# Patient Record
Sex: FEMALE
[De-identification: names, ages, dates, MRNs, and addresses within clinical notes are randomized per-mention and may not be internally consistent; named-entity substitution may affect disease eponyms.]

---

## 2019-09-11 ENCOUNTER — HOSPITAL ENCOUNTER (OUTPATIENT)
Dept: HOSPITAL 46 - ED | Age: 39
Setting detail: OBSERVATION
LOS: 1 days | Discharge: HOME | End: 2019-09-12
Attending: OBSTETRICS & GYNECOLOGY | Admitting: OBSTETRICS & GYNECOLOGY
Payer: SELF-PAY

## 2019-09-11 VITALS — HEIGHT: 63 IN | WEIGHT: 185.36 LBS | BODY MASS INDEX: 32.84 KG/M2

## 2019-09-11 DIAGNOSIS — E66.9: ICD-10-CM

## 2019-09-11 DIAGNOSIS — Z79.4: ICD-10-CM

## 2019-09-11 DIAGNOSIS — Z88.5: ICD-10-CM

## 2019-09-11 DIAGNOSIS — E11.9: ICD-10-CM

## 2019-09-11 DIAGNOSIS — O03.4: Primary | ICD-10-CM

## 2019-09-11 DIAGNOSIS — Z88.0: ICD-10-CM

## 2019-09-11 PROCEDURE — G0378 HOSPITAL OBSERVATION PER HR: HCPCS

## 2019-09-11 PROCEDURE — 10D17Z9 MANUAL EXTRACTION OF PRODUCTS OF CONCEPTION, RETAINED, VIA NATURAL OR ARTIFICIAL OPENING: ICD-10-PCS | Performed by: OBSTETRICS & GYNECOLOGY

## 2019-09-11 NOTE — HP
[facility]
                                    [address], [place]  [address]
_________________________________________________________________________________________
                                                                 Draft    
 
 
ADMISSION DATE:  
2019
 
HISTORY OF PRESENT ILLNESS:  
The patient is a 39-year-old  2, para 1,  female, who apparently went
through a spontaneous  back on 2019 in [place], and carries with her a
picture on her phone showing a small fetus what appears to be placental tissue and cord.
 Nonetheless, she was given some misoprostol to probably try to complete evacuation of
anything that was left in the uterus and she continued to have some mild spotting,
bleeding, and cramping since then, but about 4 days ago, she started having much more
severe pain and a little bit heavier bleeding.  She presented to the emergency room this
evening in [place] with severe pelvic pain and also one day history of chest and back
pain that appears to be anxiety-related. 
 
PAST MEDICAL HISTORY:  
She had a prior pregnancy 3-1/2 years ago, vaginal delivery without difficulties.  She
was diabetic during that pregnancy and required insulin.  She has been diagnosed with
diabetes, not just gestational diabetes and has been on metformin 500 mg twice daily and
also takes NovoLog 40 units at bedtime and then p.r.n. NovoLog 3 times a day before
meals based upon blood sugar fingersticks.  This morning, when she had breakfast about
10 o'clock, her blood sugar prior to that was 154, and she took 7 units of NovoLog.
(Her admission blood sugar in the emergency room is 228).  Past medical history is
unremarkable, but she did have  section with her first pregnancy, which is the
only surgery that she has had. 
 
ALLERGIES:  
She is allergic to penicillin.
 
FAMILY HISTORY:  
Mother with diabetes and hypertension, and also had a very early breast cancer few years
ago, which apparently has been cured. 
 
PHYSICAL EXAMINATION:
VITAL SIGNS:  All normal.  Those specific values are documented on the chart.  On
admission, she did have a tachycardia and along with the chest pain, she did a full EKG,
which was read as completely normal.  The tachycardia, however, has essentially
resolved, without IV fluids or any other treatment. 
HEART:  Regular rate and rhythm without murmurs. 
LUNGS:  Clear to auscultation. 
BACK:  No CVA tenderness, although she does express pain in both the chest and back
which is very generalized and pretty much mid position, there is no tenderness anywhere. 
PELVIC:  On pelvic examination, which is just done on the exam bed without a speculum
 
                                                                                    
_________________________________________________________________________________________
PATIENT NAME:     DOC SANDERSON                  
MEDICAL RECORD #: [id number]            HISTORY AND PHYSICAL          
          ACCT #: [id number]  
DATE OF BIRTH:   [birth year]            REPORT #: [id number]      
PHYSICIAN:                                      
PCP:              NO PRIMARY CARE PHYSICIAN     
REPORT IS CONFIDENTIAL AND NOT TO BE RELEASED WITHOUT AUTHORIZATION
 
 
                                  [facility]
                                    [address], [place]  [address]
_________________________________________________________________________________________
                                                                 Draft    
 
 
exam, the uterus seems to be probably anteflexed, but is difficult to palpate due to
abdominal obesity and tenderness.  The uterus is somewhat tender.  Cervix is palpated,
feels normal, but feels closed.  There is small amount bloody mucus on the examining
glove upon exit from the vagina. 
EXTREMITIES:  Within normal limits.
 
RESULTS:  
CBC referred to chart, but hemoglobin was normal and white count was 11,000.  The blood
sugar, as mentioned above was 228.  Other labs were normal.  Pelvic ultrasound was done,
which shows products of conception retained with considerable "debris" within the uterus
which appears to be slightly enlarged by measurement. 
 
IMPRESSION:  
Retained product of conception following spontaneous  about a month and half
ago.  There is no evidence that the patient is infected at this point, but with the
increasing pelvic pain, this could be in the consideration for an impending infection. 
 
PLAN:  
We will perform a suction D and C to remove any retaining products of conception, and
then evaluate as to whether we can send her home this evening after the surgery or need
to keep her overnight depending on how much pain she still has and depending on her
vital signs as well.  I will give her a dose of antibiotic, probably 2 g of Ancef prior
to the D and C. 
 
 
 
            ________________________________________
            MD STEVE Wiggins/CHARLES
Job #:  [id number]
DD:  2019 19:40:34
DT:  2019 21:48:36
 
 
Copies:                                
~
 
 
 
 
 
                                                                                    
_________________________________________________________________________________________
PATIENT NAME:     DOC SANDERSON                  
MEDICAL RECORD #: [id number]            HISTORY AND PHYSICAL          
          ACCT #: [id number]  
DATE OF BIRTH:   [birth year]            REPORT #: [id number]      
PHYSICIAN:                                      
PCP:              NO PRIMARY CARE PHYSICIAN     
REPORT IS CONFIDENTIAL AND NOT TO BE RELEASED WITHOUT AUTHORIZATION

## 2019-09-11 NOTE — OR
[facility]
                                    [address] [facility]
                                  [place], [place]  [address]
_________________________________________________________________________________________
                                                                 Draft    
 
 
DATE OF OPERATION:
2019
 
SURGEON:
Lukas Vick MD
 
PREOPERATIVE DIAGNOSIS:
Spontaneous  with retained products of conception.
 
POSTOPERATIVE DIAGNOSIS:
Spontaneous  with retained products of conception.
 
PROCEDURE PERFORMED:
Suction and sharp curettage.
 
ANESTHESIA:
General; Cleveland Sifuentes CRNA.
 
ESTIMATED BLOOD LOSS:
435 mL (measured).
 
COMPLICATIONS:
None.
 
DESCRIPTION OF PROCEDURE:
The patient was taken to the operating room, placed in the dorsal lithotomy position
under general anesthesia and prepped and draped in the usual fashion.  Her exam under
anesthesia revealed the uterus to be anteflexed and approximately normal size, but this
was difficult to ascertain secondary to abdominal obesity.  A Graves speculum was placed
for easy visualization of the cervix, and the cervix was grasped with the single-tooth
tenaculum on the anterior lip.  The uterine sound was inserted and the uterus sounded to
12 cm depth.  Hegar dilators were utilized up to a #10 dilator and then a #9 curved
suction curette was introduced.  A moderate amount of blood and clot and perhaps a small
amount of apparent products of concept was obtained, but not a significant amount of
products of conception.  The suction curette was removed and a sharp curette and then
serrated curette were utilized to attempt to ascertain complete removal of all products
of conception on all the endometrial surface areas.  Following this, the suction curette
was reintroduced and some additional blood was obtained with very little additional
tissue if any.  A 1000 mcg Cytotec suppository was placed rectally and the uterus was
massaged and the bleeding stopped rather abruptly.  The patient tolerated the procedure
well. 
 
 
                                                                                    
_________________________________________________________________________________________
PATIENT NAME:     DOC SANDERSON                  
MEDICAL RECORD #: [id number]            OPERATIVE REPORT              
          ACCT #: [id number]  
DATE OF BIRTH:   [birth year]            REPORT #: [id number]      
PHYSICIAN:                                      
PCP:              NO PRIMARY CARE PHYSICIAN     
REPORT IS CONFIDENTIAL AND NOT TO BE RELEASED WITHOUT AUTHORIZATION
 
 
                                  [address]
                                  [place]  [address]
_________________________________________________________________________________________
                                                                 Draft    
 
 
 
            ________________________________________
            MD JOHNNA Wiggins
Job #:  [id number]
DD:  2019 20:49:11
DT:  2019 22:07:39
 
 
Copies:                                
~
 
 
 
 
 
 
 
 
 
 
 
 
 
 
 
 
 
 
 
 
 
 
 
 
 
 
 
 
 
 
 
                                                                                    
_________________________________________________________________________________________
PATIENT NAME:     DOC SANDERSON                  
MEDICAL RECORD #: [id number]            OPERATIVE REPORT              
          ACCT #: [id number]  
DATE OF BIRTH:   [birth year]            REPORT #: [id number]      
PHYSICIAN:                                      
PCP:              NO PRIMARY CARE PHYSICIAN     
REPORT IS CONFIDENTIAL AND NOT TO BE RELEASED WITHOUT AUTHORIZATION

## 2019-09-11 NOTE — DS
[facility]
                                    [address], [place]  [address]
_________________________________________________________________________________________
                                                                 Draft    
 
 
ADMISSION DATE:  2019
 
DISCHARGE DATE:  2019
 
HOSPITAL COURSE:
The patient is a 39-year-old  3, para 2, AB 1, who was admitted through the
emergency room last night with severe pelvic pain x3-4 days and retained products of
conception.  She had undergone a spontaneous  on 2019 in [place] and was
given single dose of misoprostol to hopefully complete a removal of products of
conception.  This apparently was not sufficient to completely empty the uterus and she
continued to have some bleeding off and on for the last month and half until she began
starting having severe pain a few days ago.  She denied any fever or any other symptoms
whatever.  On admission to the emergency room, she was afebrile and her vital signs were
essentially normal, although she did have an admission tachycardia that resolved with
simply some time in the emergency room without any therapy whatever.  She was also
having some chest pain and back pain at the same time and an EKG was done, which was
read as normal.  The chest pain was assumed to simply be anxiety.  Her admission lab
work was normal with a hemoglobin of 14.6 and a normal lipase of 16, but she did have
sightly elevated liver enzymes, unexplained by anything in her history.  Upon my
examination, she had a somewhat tender uterus on bimanual exam and the cervix appeared
to be completely closed with a small amount of bloody mucus being present.  The rest of
her examination was quite unremarkable.  The ultrasound indicated quite obvious retained
products of conception and she was taken to the operating room yesterday evening where a
suction and sharp D and C was accomplished with removal of any remaining products of
concept, with 435 mL of measured blood loss during the surgery.  Postoperatively, in
terms of the D and C, she was fine with very minimal bleeding, but she did resume with
her chest pain and abdominal pain, which was very vague and difficult to localize.
Because of concern of possible gallbladder disease, an upper abdomen ultrasound was
accomplished, which was completely negative for all organ systems visualized.  She has
been diabetic for many years and Medicine consult was obtained with management of her
diabetes through the night.  Her hemoglobin Alc was 9.1 on admission, so she obviously
is not well controlled, but she will resume her normal insulin administration, which
consists of 40 units of Lantus at bedtime and sliding scale NovoLog injection before
meals.  She is to follow up with her regular doctor in [place] for continued diabetic
care.  She is also to follow up with her OB care provider in [place] as far as
completion of this SAB.  Upon discharge, she was pain free, having minimal bleeding and
very hungry and ready to be discharged.  She was discharged with instructions and will
follow up as indicated.  No medications are given her in terms of prescription, but she
is encouraged to stay on prenatal vitamins indefinitely until she goes to her menopause,
and the reasons for that are explained to her. 
 
 
 
                                                                                    
_________________________________________________________________________________________
PATIENT NAME:     DOC SANDERSON                  
MEDICAL RECORD #: [id number]            DISCHARGE SUMMARY             
          ACCT #: [id number]  
DATE OF BIRTH:   [birth year]            REPORT #: [id number]      
PHYSICIAN:                                      
PCP:              NO PRIMARY CARE PHYSICIAN     
REPORT IS CONFIDENTIAL AND NOT TO BE RELEASED WITHOUT AUTHORIZATION
 
 
                                  [facility]
                                    [address], [place]  [address]
_________________________________________________________________________________________
                                                                 Draft    
 
 
 
            ________________________________________
            MD STEVE Wiggins/CHARLES
Job #:  [id number]
DD:  2019 09:19:47
DT:  2019 23:13:15
 
 
Copies:                                
~
 
 
 
 
 
 
 
 
 
 
 
 
 
 
 
 
 
 
 
 
 
 
 
 
 
 
 
 
 
 
 
                                                                                    
_________________________________________________________________________________________
PATIENT NAME:     SORIANODOC KELLY                  
MEDICAL RECORD #: [id number]            DISCHARGE SUMMARY             
          ACCT #: [id number]  
DATE OF BIRTH:   [birth year]            REPORT #: [id number]      
PHYSICIAN:                                      
PCP:              NO PRIMARY CARE PHYSICIAN     
REPORT IS CONFIDENTIAL AND NOT TO BE RELEASED WITHOUT AUTHORIZATION

## 2019-09-12 NOTE — EKG
[facility]
                                    [address] [facility]
                                  [place], [place]  [address]
_________________________________________________________________________________________
                                                                 Signed   
 
 
Normal sinus rhythm
Normal ECG
When compared with ECG of 11-SEP-2019 15:57, (Unconfirmed)
No significant change was found
Confirmed by RICKY OROZCO MD ([id number]) on 9/12/2019 1:08:29 PM
 
 
 
 
 
 
 
 
 
 
 
 
 
 
 
 
 
 
 
 
 
 
 
 
 
 
 
 
 
 
 
 
 
 
 
 
 
 
 
 
    Electronically Signed By: RICKY OROZCO MD  09/12/19 1308
_________________________________________________________________________________________
PATIENT NAME:     DOC SANDERSON                  
MEDICAL RECORD #: [id number]                     Electrocardiogram             
          ACCT #: [id number]  
DATE OF BIRTH:   [birth year]                                       
PHYSICIAN:   RICKY OROZCO MD           REPORT #: [id number]
REPORT IS CONFIDENTIAL AND NOT TO BE RELEASED WITHOUT AUTHORIZATION

## 2019-09-12 NOTE — EKG
[facility]
                                    [address] [facility]
                                  [place], [place]  [address]
_________________________________________________________________________________________
                                                                 Signed   
 
 
Sinus tachycardia
Otherwise normal ECG
No previous ECGs available
Confirmed by RICKY OROZCO MD ([id number]) on 9/12/2019 1:08:16 PM
 
 
 
 
 
 
 
 
 
 
 
 
 
 
 
 
 
 
 
 
 
 
 
 
 
 
 
 
 
 
 
 
 
 
 
 
 
 
 
 
 
    Electronically Signed By: RICKY OROZCO MD  09/12/19 1308
_________________________________________________________________________________________
PATIENT NAME:     DOC SANDERSON                  
MEDICAL RECORD #: [id number]                     Electrocardiogram             
          ACCT #: [id number]  
DATE OF BIRTH:   [birth year]                                       
PHYSICIAN:   RICKY OROZCO MD           REPORT #: [id number]
REPORT IS CONFIDENTIAL AND NOT TO BE RELEASED WITHOUT AUTHORIZATION

## 2019-09-16 NOTE — PATH
[facility]
                                    [address], [place]  [address]
_________________________________________________________________________________________
                                                                 Signed   
 
 
 
SPECIMEN(S): A PRODUCTS OF CONCEPTION
 
SPECIMEN SOURCE:
A. PRODUCTS OF CONCEPTION
 
CLINICAL HISTORY:
Retained POC S/P .
 
FINAL PATHOLOGIC DIAGNOSIS:
Products of conception, submitted:
-  Products of conception.
DDF:reta:[id number]
 
MICROSCOPIC EXAMINATION:
Histologic sections of all submitted blocks are examined by light microscopy.  
These findings, together with the gross examination, support the pathologic 
diagnosis. 
 
GROSS DESCRIPTION:
The specimen, labeled "IS, POC," is received in formalin and consists of a 7.5 
x 7.0 x 1.6 cm aggregate of red-brown spongy soft tissue with admixed pink 
rubbery tissue fragment.  No fetal parts are 
grossly identified. Representative sections are submitted in cassette (A1-A3).
FB (under the direct supervision of a pathologist)
The Gross Description was prepared using a voice recognition system.  The 
report was reviewed for accuracy; however, sound-alike word errors, addition 
and/or deletions may occur.  If there is any 
question about this report, please contact Client Services.
 
PERFORMING LABORATORY:
The technical component was performed by [facility], [address] [place] [id number] (Medical Director: Alivia Henry MD; CLIA# [id number]).  
Professional [facility] 
[facility], [address] [address] (Medical Director:  Silver Smart MD; CLIA# 
[id number]). 
 
Diagnostician:  Nimesh Gastelum DO
Pathologist
Electronically Signed 2019
 
 
                                                                                    
_________________________________________________________________________________________
PATIENT NAME:     DOC SANDERSON                  
MEDICAL RECORD #: [id number]            PATHOLOGY                     
          ACCT #: [id number]       ACCESSION #: [id number]     
DATE OF BIRTH:   [birth year]            REPORT #: [id number]       
PHYSICIAN:        [facility]              
PCP:              NO PRIMARY CARE PHYSICIAN     
REPORT IS CONFIDENTIAL AND NOT TO BE RELEASED WITHOUT AUTHORIZATION
 
 
                                  [address]  [address]
_________________________________________________________________________________________
                                                                 Signed   
 
 
Copies:                                
~
 
 
 
 
 
 
 
 
 
 
 
 
 
 
 
 
 
 
 
 
 
 
 
 
 
 
 
 
 
 
 
 
 
 
 
 
 
 
 
 
 
                                                                                    
_________________________________________________________________________________________
PATIENT NAME:     DOC SANDERSON                  
MEDICAL RECORD #: [id number]            PATHOLOGY                     
          ACCT #: [id number]       ACCESSION #: [id number]     
DATE OF BIRTH:   [birth year]            REPORT #: [id number]       
PHYSICIAN:        [facility]              
PCP:              NO PRIMARY CARE PHYSICIAN     
REPORT IS CONFIDENTIAL AND NOT TO BE RELEASED WITHOUT AUTHORIZATION